# Patient Record
Sex: FEMALE | Race: WHITE
[De-identification: names, ages, dates, MRNs, and addresses within clinical notes are randomized per-mention and may not be internally consistent; named-entity substitution may affect disease eponyms.]

---

## 2018-01-22 ENCOUNTER — APPOINTMENT (OUTPATIENT)
Dept: ORTHOPEDIC SURGERY | Facility: CLINIC | Age: 63
End: 2018-01-22

## 2018-07-11 ENCOUNTER — APPOINTMENT (OUTPATIENT)
Dept: ORTHOPEDIC SURGERY | Facility: CLINIC | Age: 63
End: 2018-07-11
Payer: MEDICARE

## 2018-07-11 VITALS — HEIGHT: 62 IN | BODY MASS INDEX: 21.35 KG/M2 | WEIGHT: 116 LBS

## 2018-07-11 DIAGNOSIS — Z87.891 PERSONAL HISTORY OF NICOTINE DEPENDENCE: ICD-10-CM

## 2018-07-11 DIAGNOSIS — Z82.61 FAMILY HISTORY OF ARTHRITIS: ICD-10-CM

## 2018-07-11 DIAGNOSIS — Z78.9 OTHER SPECIFIED HEALTH STATUS: ICD-10-CM

## 2018-07-11 DIAGNOSIS — S46.012A STRAIN OF MUSCLE(S) AND TENDON(S) OF THE ROTATOR CUFF OF LEFT SHOULDER, INITIAL ENCOUNTER: ICD-10-CM

## 2018-07-11 DIAGNOSIS — I10 ESSENTIAL (PRIMARY) HYPERTENSION: ICD-10-CM

## 2018-07-11 DIAGNOSIS — Z80.9 FAMILY HISTORY OF MALIGNANT NEOPLASM, UNSPECIFIED: ICD-10-CM

## 2018-07-11 DIAGNOSIS — M81.0 AGE-RELATED OSTEOPOROSIS W/OUT CURRENT PATHOLOGICAL FRACTURE: ICD-10-CM

## 2018-07-11 DIAGNOSIS — M19.90 UNSPECIFIED OSTEOARTHRITIS, UNSPECIFIED SITE: ICD-10-CM

## 2018-07-11 DIAGNOSIS — Z82.62 FAMILY HISTORY OF OSTEOPOROSIS: ICD-10-CM

## 2018-07-11 PROCEDURE — 73030 X-RAY EXAM OF SHOULDER: CPT | Mod: 50

## 2018-07-11 PROCEDURE — 99204 OFFICE O/P NEW MOD 45 MIN: CPT | Mod: 25

## 2018-07-11 PROCEDURE — 20610 DRAIN/INJ JOINT/BURSA W/O US: CPT | Mod: LT

## 2018-07-11 RX ORDER — TELMISARTAN 80 MG/1
80 TABLET ORAL
Refills: 0 | Status: ACTIVE | COMMUNITY

## 2018-07-11 RX ORDER — OXYCODONE 10 MG/1
10 TABLET ORAL
Refills: 0 | Status: ACTIVE | COMMUNITY

## 2018-07-11 RX ORDER — METHYLPHENIDATE HYDROCHLORIDE 10 MG/1
10 TABLET ORAL
Refills: 0 | Status: ACTIVE | COMMUNITY

## 2018-07-11 RX ORDER — AMLODIPINE BESYLATE 10 MG/1
10 TABLET ORAL
Refills: 0 | Status: ACTIVE | COMMUNITY

## 2018-07-11 NOTE — PHYSICAL EXAM
[de-identified] : Bilateral  shoulders:\par Cervical spine is with mild stiffness and tenderness .\par Normal appearance shoulders. \par AROM: 180 FE with pain and difficulty bilateral shoulders, IR to T 11 with pain , 180 abd.\par PROM: 180 FE, 60 ER at the side, 90 ER and 15 IR in the 90 degree abducted position.\par Motor:  5-/5  supraspinatus with severe pain,  5-/5 ER with pain, 5/5 IR, 5/5 biceps, 5/5 deltoid.  Normal lift off test\par + Neer, + Her. -  X-arm.   + [place], + Speeds.\par Tender over the biceps tendon and the anterior shoulder.  \par Laxity: normal.\par No scapular winging.\par Sensation is intact distally in the UE.\par Skin is intact in the UE. \par Intact Motor distally.\par  [de-identified] : Incisions bilateral lower extremities. Severe varus deformity of bilateral knees [de-identified] : No respiratory distress [de-identified] : X-rays of bilateral shoulders AP and Y. lateral views today show some elevation of the right humeral head partially consistent with a mild rotator cuff arthropathy but no joint space narrowing.\par On the left side the head is well centered and no arthritis

## 2018-07-11 NOTE — DISCUSSION/SUMMARY
[de-identified] : 63-year-old woman who has a history of complete right supraspinatus rotator cuff tear at least 4 or 5 years ago now with some mild rotator cuff arthropathy but compensating relatively well. She is having more severe pain on the left side where she had a partial rotator cuff tear that likely is progressing and may be a full-thickness tear now.\par I talked to her about operative and nonoperative treatment options for rotator cuff tears in detail. The reason we did not operate several years ago when she presented with the tear is because she also has severe arthritis in bilateral lower extremities and had multiple stress fractures. She needed to use a cane and sometimes a crutch or walker to ambulate and she lies on her arms for pushing and pulling and helping her get in and out of chairs or up stairs and therefore would be more difficult to rehabilitation and recovery from shoulder surgery since she would not be able to use the affected arm. That issue remains today. That being said if her shoulders are not doing well we may want to consider surgery at some point. She would not want to do surgery now this summer and would like to have relief of pain particularly in the left shoulder. She is already on narcotics and followed by pain management because of her other joint issues.\par I recommended trying a corticosteroid injection in the left shoulder subacromial space and we will see if the conservative relief. She will go to physical therapy as well.\par Heat and ice as needed.\par Followup in 2-3 months depending on how she is doing.\par She is disabled from her multiple orthopedic issues.

## 2018-07-11 NOTE — HISTORY OF PRESENT ILLNESS
[de-identified] : Angela is now 62 yo. I last saw her in August 2014, about 4 years ago.\par She was having pain in her shoulder is starting around 2013. She had an MRI and ultrasound of the right shoulder. One MRI showed a partial tear but the other MRI and ultrasound showed a complete supraspinatus tear. She did have one corticosteroid injection. She did a lot of physical therapy and her shoulder did get significantly but never completely better. She did have a little bit of left shoulder pain at that time and an MRI showed a partial rotator cuff tear.\par  The left got progressively worse over the past several months. She normally sleeps on the left but it hurts and wakes hurt.\par If she moves either arm suddenly or Moves she may get sharp pain. Today she pulled a door which was heavy and the right was severely painful.\par Pain is variable. Pain can be 6/10 Shoulders. Pain radiates to her fingers.\par No neck pain but she feels some tightness and stiffness in her neck.\par She has pain every day Shoulders as well as lower extremities. She uses a cane to ambulate. She has severe bilateral knee and ankle osteoarthritis and deformity in bilateral legs which I treated for many years as well. She has a history of multiple stress fractures. Her arthritis is severe bone-on-bone. She did not have knee replacements done because of the severe deformity. She had consultations and a told her that she would need the bone was broken and realigned prior to doing any knee replacement and it would be at least a 2 year process involving multiple surgeries. She puts more pressure on her arms because of the leg pain and weakness and limited motion.\par \par She is on OxyContin and oxycodone for her chronic pain. She sees pain management

## 2019-11-27 ENCOUNTER — APPOINTMENT (OUTPATIENT)
Dept: ORTHOPEDIC SURGERY | Facility: CLINIC | Age: 64
End: 2019-11-27
Payer: MEDICARE

## 2019-11-27 DIAGNOSIS — M75.42 IMPINGEMENT SYNDROME OF LEFT SHOULDER: ICD-10-CM

## 2019-11-27 PROCEDURE — 73030 X-RAY EXAM OF SHOULDER: CPT | Mod: RT

## 2019-11-27 PROCEDURE — 20610 DRAIN/INJ JOINT/BURSA W/O US: CPT | Mod: RT

## 2019-11-27 PROCEDURE — 99213 OFFICE O/P EST LOW 20 MIN: CPT | Mod: 25

## 2019-11-27 RX ORDER — AMLODIPINE BESYLATE 5 MG/1
5 TABLET ORAL
Qty: 180 | Refills: 0 | Status: ACTIVE | COMMUNITY
Start: 2019-01-30

## 2019-11-27 NOTE — REASON FOR VISIT
[Initial Visit] : an initial visit for [Follow-Up Visit] : a follow-up visit for [Shoulder Pain] : shoulder pain

## 2019-11-27 NOTE — PHYSICAL EXAM
[de-identified] : Bilateral shoulders\par Cervical spine is without tenderness and ROM is within normal limits and without pain.\par no edema, ecchymoses, erythema.\par Tender RIGHT greater than LEFT glenohumeral joint.\par Forward elevation of the RIGHT shoulder is very painful but she can lift with some difficulty to about 170° similar bilaterally. No pain on the LEFT.\par Internal rotation to L4 on the RIGHT versus T10 on the LEFT.\par External rotation to about 45°.\par Right-sided with a very positive Neer and Her. Minimal discomfort on the LEFT.\par Normal neurovascular exam distally aside from some mild decreased sensation in the fingertips on the LEFT.\par \par Very antalgic gait.  severe varus of the knees. [de-identified] : \par X-rays of the RIGHT shoulder 3 views today show mild elevation of the glenohumeral joint. There may be some erosion of the under surface of the acromion consistent with rotator cuff arthropathy.

## 2019-11-27 NOTE — ASSESSMENT
[FreeTextEntry1] : 64-year-old with chronic RIGHT rotator cuff tear Diagnosed about 5 years ago.At that time and even now she is having a lot of difficulty with her legs and needs to walk with a cane and often 2 canes and needs to push with her arms frequently putting a lot of stress on her shoulders. Her RIGHT shoulder had been doing relatively well and last time I saw her she actually had more LEFT shoulder pain.\par Now the RIGHT shoulder is acutely painful in the last few weeks. We did a corticosteroid injection today which hopefully will give her some relief. Heat and ice as needed.\par Physical therapy. Home exercises doing assisted range of motion. If it's not getting better she should get an MRI and I will call her with the results or otherwise come in for followup as needed.

## 2019-11-27 NOTE — HISTORY OF PRESENT ILLNESS
[de-identified] : Ms. Henriquez is now 64 years old. She was last seen about 16 months ago. She had near complete supraspinatus tear chronically in the RIGHT shoulder first diagnosed around 2014. She was also having LEFT shoulder pain and we did a corticosteroid injection in the LEFT subacromial bursa at that time.\par She comes in now for Pain in her RIGHT shoulder. The LEFT shoulder is doing fine. It hurts to drive, move the shoulder, dress. Pain is quite severe over the last 2 weeks and not responding to medication\par she is on chronic narcotics, oxycodone and OxyContin for severe arthritis in her hips and knees posttraumatic with severe deformity in her lower extremities. She walks with crutches.

## 2019-11-27 NOTE — PROCEDURE
[Injection] : Injection [Right] : of the right [Subacromial Bursa] : subacromial bursa [Inflammation] : inflammation [Joint Pain] : joint pain [Bleeding] : bleeding [Infection] : infection [Allergic Reaction] : allergic reaction [Patient] : patient [Risk] : risk [Benefits] : benefits [Alternatives] : alternatives [Verbal Consent Obtained] : verbal consent was obtained prior to the procedure [Alcohol] : alcohol [Betadine] : betadine [Ethyl Chloride Spray] : ethyl chloride spray was used as a topical anesthetic [Posterior] : posterior [Same Needle As Aspiration/New Syringe] : the syringe was changed and the same needle was left in place and [1% Lidocaine___(mL)] : [unfilled] mL of 1% Lidocaine [Methylpred. 40mg/mL___(mL)] : [unfilled] mL 40mg/mL methylprednisolone [Bandage Applied] : a bandage [Tolerated Well] : The patient tolerated the procedure well [None] : none [No Strenuous Activity___day(s)] : avoid strenuous activity for [unfilled] day(s) [PRN] : PRN

## 2022-04-26 ENCOUNTER — APPOINTMENT (OUTPATIENT)
Dept: ORTHOPEDIC SURGERY | Facility: AMBULATORY SURGERY CENTER | Age: 67
End: 2022-04-26
Payer: MEDICARE

## 2022-04-26 DIAGNOSIS — M17.2 BILATERAL POST-TRAUMATIC OSTEOARTHRITIS OF KNEE: ICD-10-CM

## 2022-04-26 DIAGNOSIS — M19.172 POST-TRAUMATIC OSTEOARTHRITIS, LEFT ANKLE AND FOOT: ICD-10-CM

## 2022-04-26 DIAGNOSIS — M19.072 PRIMARY OSTEOARTHRITIS, LEFT ANKLE AND FOOT: ICD-10-CM

## 2022-04-26 DIAGNOSIS — M16.4: ICD-10-CM

## 2022-04-26 PROCEDURE — 73560 X-RAY EXAM OF KNEE 1 OR 2: CPT | Mod: 50

## 2022-04-26 PROCEDURE — 99214 OFFICE O/P EST MOD 30 MIN: CPT | Mod: 25

## 2022-04-26 PROCEDURE — 73630 X-RAY EXAM OF FOOT: CPT | Mod: LT

## 2022-04-26 PROCEDURE — 20605 DRAIN/INJ JOINT/BURSA W/O US: CPT | Mod: LT
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
## 2022-04-26 NOTE — PHYSICAL EXAM
[Cane] : ambulates with cane [Normal RLE] : Right Lower Extremity: No scars, rashes, lesions, ulcers, skin intact [Normal LLE] : Left Lower Extremity: No scars, rashes, lesions, ulcers, skin intact [Normal Touch] : sensation intact for touch [Normal] : Oriented to person, place, and time, insight and judgement were intact and the affect was normal [de-identified] : Bilateral knees\par Multiple well-healed incisions.\par Varus deformity left greater than right knee and flexion contractures left greater than right knee with severe crepitus on range of motion and pain.\par Left knee range of motion is from about 25 to 90 degrees flexion versus right knee is about 15 degrees to 95 degrees.\par Tender medial and lateral and especially patellofemoral joint.  Severe crepitus with range of motion.\par \par Right shoulder\par Mild cervical spine tenderness\par no edema, ecchymoses, erythema.\par Tender RIGHT greater than LEFT glenohumeral joint.\par Forward elevation of the RIGHT shoulder is painful with assisted motion to 170 degrees but actively she cannot lift the arm without assistance.  .\par Internal rotation to L4 on the RIGHT versus T10 on the LEFT.\par External rotation to about 35°.\par Profound weakness of forward elevation/supraspinatus and external rotation and 5/5 internal rotation right shoulder\par Right positive Neer and Her. Minimal discomfort on the LEFT.\par Normal neurovascular exam distally aside from some mild decreased sensation in the fingertips on the LEFT.\par \par Left ankle\par Severely tender all around the left ankle.  Bony protrusion from large osteophyte anterior ankle.\par Virtually no dorsiflexion plantarflexion of the ankle joint which is very painful.\par Sensation is intact.\par Skin is intact.  No erythema.  Mild edema.  No ecchymoses\par Varicose veins. [de-identified] : \par She brought in CDs with multiple x-rays.\par Today I obtained an AP x-ray of bilateral knees showing severe bone-on-bone osteoarthritis medial greater than lateral compartments of both knees.\par Prior x-rays show also severe degenerative changes at the patellofemoral joint.  Posttraumatic deformity with healed fractures tibia and femur\par \par Left foot x-rays AP, lateral and oblique show large anterior osteophyte on the talus and osteophytes around the ankle joint and talonavicular and calcaneocuboid degeneration.\par She had prior x-rays taken in November 2021 showing severe bone-on-bone ankle arthrosis.\par \par MRI of the right shoulder taken December 2020 showed advanced rotator cuff arthropathy with chronic full-thickness tears supraspinatus and upper fibers of the infraspinatus and interstitial tear subscapularis with fatty atrophy.  Degenerative changes of the labrum and osteoarthritis of the glenohumeral joint and AC joint.  Likely severe degeneration and tearing long head of the biceps.\par \par She also had x-rays she had in the last year or 2 of the pelvis/right hip showing moderately severe osteoarthritis of the right hip

## 2022-04-26 NOTE — REASON FOR VISIT
[Follow-Up Visit] : a follow-up visit for [Knee Pain] : knee pain [Knee Osteoarthritis] : knee osteoarthritis [Shoulder Pain] : shoulder pain [FreeTextEntry2] : Ankle pain

## 2022-04-26 NOTE — PROCEDURE
[Injection] : Injection [Left] : on the left.   [Intermediate Joint___] : [unfilled] joint [Osteoarthritis] : Osteoarthritis [Patient] : patient [Risk] : Risk [Benefits] : benefits [Alternatives] : alternatives [Bleeding] : bleeding [Infection] : infection [Allergic Reaction] : allergic reaction [Verbal Consent Obtained] : verbal consent was obtained prior to the procedure [Alcohol] : Alcohol [Ethyl Chloride Spray] : ethyl chloride spray was used as a topical anesthetic [Anterior] : anterior [22] : a 22-gauge [1% Lidocaine___(mL)] : [unfilled] mL of 1% Lidocaine [Triamcinolone 40mg/mL___(mL)] : [unfilled] ~UmL of 40mg/mL triamcinolone [Bandage Applied] : a bandage [Tolerated Well] : The patient tolerated the procedure well [None] : None [No Strenuous Activity___day(s)] : avoid strenuous activity for [unfilled] day(s) [PRN] : as needed [de-identified] : ChloraPrep

## 2022-04-26 NOTE — ASSESSMENT
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  She has developed progressively worsening osteoarthritis of her left ankle which is very painful.  Today we did a steroid injection in the left ankle and gave her an ankle support to see if this helps.  She could also try an [place] brace which is a custom molded brace.\par With all of her arthritis I recommended getting a walker rather than a cane which will allow her to get more pressure off her feet and assist in her walking.\par Pool therapy would be helpful.  She should try to do some nonweightbearing strengthening exercises.\par She also has chronic rotator cuff tearing with secondary cuff arthropathy/arthritis in the shoulder.  I gave her some exercises to work on and physical therapy prescription for this.\par We may get a new x-ray of her shoulder next visit if its not getting better.  A possible surgical option in the future would be balloon arthroplasty versus reverse shoulder replacement.\par \par She had asked her physiatrist about getting a electric stimulator implant to help with pain.  I do not have much experience with this but have seen patients using it for chronic back pain.  She certainly has severe chronic pain in bilateral lower extremities had virtually every joint but particularly both knees and the left ankle from severe posttraumatic osteoarthritis.  It would be preferable to use less narcotics to try to control her pain since she is not a good surgical candidate for joint arthroplasty with the lower extremity deformity.  She can always see the joint replacement specialist again to get another opinion on that.\par We discussed trying steroid injection or hyaluronic acid injections in her knees.  We may try that next visit\par Follow-up in about 4 to 6 weeks.

## 2022-04-26 NOTE — REVIEW OF SYSTEMS
[Joint Pain] : joint pain [Joint Stiffness] : joint stiffness [Joint Swelling] : joint swelling [Feeling Tired] : fatigue [Urinary Frequency] : urinary frequency [Urinary Urgency] : urinary urgency [Incontinence] : incontinence [Depression] : depression [Muscle Weakness] : muscle weakness [Negative] : Heme/Lymph

## 2022-04-27 ENCOUNTER — TRANSCRIPTION ENCOUNTER (OUTPATIENT)
Age: 67
End: 2022-04-27

## 2022-05-04 ENCOUNTER — NON-APPOINTMENT (OUTPATIENT)
Age: 67
End: 2022-05-04

## 2022-05-25 ENCOUNTER — APPOINTMENT (OUTPATIENT)
Dept: ORTHOPEDIC SURGERY | Facility: CLINIC | Age: 67
End: 2022-05-25

## 2022-09-02 ENCOUNTER — APPOINTMENT (OUTPATIENT)
Dept: ULTRASOUND IMAGING | Facility: CLINIC | Age: 67
End: 2022-09-02

## 2022-09-02 ENCOUNTER — APPOINTMENT (OUTPATIENT)
Dept: MAMMOGRAPHY | Facility: CLINIC | Age: 67
End: 2022-09-02

## 2022-09-02 PROCEDURE — 76641 ULTRASOUND BREAST COMPLETE: CPT | Mod: 50

## 2022-09-02 PROCEDURE — 77067 SCR MAMMO BI INCL CAD: CPT

## 2022-09-02 PROCEDURE — 77063 BREAST TOMOSYNTHESIS BI: CPT

## 2023-05-15 PROBLEM — M25.511 BILATERAL SHOULDER PAIN: Status: ACTIVE | Noted: 2018-07-11

## 2023-05-17 ENCOUNTER — APPOINTMENT (OUTPATIENT)
Dept: ORTHOPEDIC SURGERY | Facility: CLINIC | Age: 68
End: 2023-05-17
Payer: MEDICARE

## 2023-05-17 DIAGNOSIS — M25.512 PAIN IN RIGHT SHOULDER: ICD-10-CM

## 2023-05-17 DIAGNOSIS — M25.511 PAIN IN RIGHT SHOULDER: ICD-10-CM

## 2023-05-17 DIAGNOSIS — M75.121 COMPLETE ROTATOR CUFF TEAR OR RUPTURE OF RIGHT SHOULDER, NOT SPECIFIED AS TRAUMATIC: ICD-10-CM

## 2023-05-17 DIAGNOSIS — M12.811 OTHER SPECIFIC ARTHROPATHIES, NOT ELSEWHERE CLASSIFIED, RIGHT SHOULDER: ICD-10-CM

## 2023-05-17 DIAGNOSIS — S46.111A STRAIN OF MUSCLE, FASCIA AND TENDON OF LONG HEAD OF BICEPS, RIGHT ARM, INITIAL ENCOUNTER: ICD-10-CM

## 2023-05-17 PROCEDURE — 73030 X-RAY EXAM OF SHOULDER: CPT | Mod: RT

## 2023-05-17 PROCEDURE — 99214 OFFICE O/P EST MOD 30 MIN: CPT | Mod: 25

## 2023-05-17 PROCEDURE — 20610 DRAIN/INJ JOINT/BURSA W/O US: CPT | Mod: 59,RT

## 2023-05-17 NOTE — PHYSICAL EXAM
[Rad] : radial 2+ and symmetric bilaterally [Normal RUE] : Right Upper Extremity: No scars, rashes, lesions, ulcers, skin intact [Normal LUE] : Left Upper Extremity: No scars, rashes, lesions, ulcers, skin intact [de-identified] : \par Right shoulder\par Mild cervical spine tenderness\par no edema, ecchymoses, erythema.\par Tender RIGHT greater than LEFT glenohumeral joint.\par Forward elevation of the RIGHT shoulder is painful with assisted motion to 180 degrees.  She cannot lift the arm without some light assistance.  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  No ecchymoses or erythema or warmth.\par Internal rotation to right buttocks versus T10 on the LEFT.\par External rotation to about 35°.\par Profound weakness of forward elevation/supraspinatus and external rotation and 5-/5 internal rotation right shoulder\par Right positive Neer and Her. Minimal discomfort on the LEFT.\par Normal neurovascular exam distally aside from some mild decreased sensation in the fingertips on the LEFT.\par  [de-identified] : \par \par MRI of the right shoulder taken December 2020 showed advanced rotator cuff arthropathy with chronic full-thickness tears supraspinatus and upper fibers of the infraspinatus and interstitial tear subscapularis with fatty atrophy.  Degenerative changes of the labrum and osteoarthritis of the glenohumeral joint and AC joint.  Likely severe degeneration and tearing long head of the biceps.\par \par X-rays of the right shoulder 3 views today show small osteophyte humeral head with mild narrowing glenohumeral joint and partial elevation humeral head but not to the acromion.  There may be some sclerosis on the undersurface of the acromion

## 2023-05-17 NOTE — ASSESSMENT
[FreeTextEntry1] : 68-year-old with chronic retracted right rotator cuff tear with rotator cuff arthropathy moderate with recent increase in pain after she had been using the arm to clean.\par She is having a hysterectomy next week.  She needs her shoulder to be better to help after surgery.  It was decided to do a steroid injection today which hopefully will give her some relief and she did have some immediate partial relief but still weakness which is very chronic.  Ultimately reverse shoulder arthroplasty may be considered.  Balloon arthroplasty may be a consideration but may not be enough given the amount of weakness she has.  Certainly would not burn any bridges and depending on how she does after the steroid injection and some physical therapy in the future we could decide if surgery is something to consider.\par If her shoulder does not get better then she will get an MRI before I see her back to further assess the extent of rotator cuff tearing and atrophy which might help guide surgical treatment if it is needed.\par

## 2023-05-17 NOTE — PROCEDURE
[Injection] : Injection [Right] : of the right [GH Joint] : glenohumeral joint [Inflammation] : inflammation [Joint Pain] : joint pain [Bleeding] : bleeding [Infection] : infection [Allergic Reaction] : allergic reaction [Patient] : patient [Risk] : risk [Benefits] : benefits [Alternatives] : alternatives [Verbal Consent Obtained] : verbal consent was obtained prior to the procedure [Alcohol] : alcohol [Ethyl Chloride Spray] : ethyl chloride spray was used as a topical anesthetic [Posterior] : posterior [22] : a 22-gauge [1% Lidocaine___(mL)] : [unfilled] mL of 1% Lidocaine [Triamcinolone 40mg/mL___(mL)] : [unfilled] ~UmL of 40mg/mL triamcinolone [Bandage Applied] : a bandage [Tolerated Well] : The patient tolerated the procedure well [None] : none [No Strenuous Activity___day(s)] : avoid strenuous activity for [unfilled] day(s) [FreeTextEntry1] : ChloraPrep

## 2023-05-17 NOTE — HISTORY OF PRESENT ILLNESS
[de-identified] : Ms. Henriquez is now 68 years old. She was last seen about  1 yr ago.  He comes in for bilateral shoulder pain much worse on the right than left that got worse at the end of April.  She had been sick and then had to do some cleaning and after polishing some furniture she had severe right shoulder pain and swelling.  It was harder to lift her arm and it was red and swollen.  The redness and swelling went down.  Pain has been decreasing but now is about 5 out of 10.\par She takes OxyContin 30 mg and oxycodone for pain chronically.  She takes Tylenol.  She cannot take NSAIDs because she is allergic.\par She also has chronic shoulder pain on the right side with a chronic rotator cuff tear and was developing rotator cuff arthropathy.\par She is going to be having a hysterectomy, oophorectomy and bladder surgery in the near future and needs her shoulder to feel better.\par For the last few years she has to assist her shoulder elevation on the right side helping it with her left arm.  Once she gets it up overhead then she can hold the arm up.  No stiffness has been present.\par

## 2024-03-19 ENCOUNTER — APPOINTMENT (OUTPATIENT)
Dept: MAMMOGRAPHY | Facility: CLINIC | Age: 69
End: 2024-03-19
Payer: MEDICARE

## 2024-03-19 ENCOUNTER — APPOINTMENT (OUTPATIENT)
Dept: ULTRASOUND IMAGING | Facility: CLINIC | Age: 69
End: 2024-03-19
Payer: MEDICARE

## 2024-03-19 PROCEDURE — 77067 SCR MAMMO BI INCL CAD: CPT

## 2024-03-19 PROCEDURE — 77063 BREAST TOMOSYNTHESIS BI: CPT

## 2024-03-19 PROCEDURE — 76641 ULTRASOUND BREAST COMPLETE: CPT | Mod: 50,GY

## 2024-07-26 ENCOUNTER — APPOINTMENT (OUTPATIENT)
Dept: PAIN MANAGEMENT | Facility: CLINIC | Age: 69
End: 2024-07-26
Payer: MEDICARE

## 2024-07-26 VITALS
SYSTOLIC BLOOD PRESSURE: 126 MMHG | HEIGHT: 62 IN | BODY MASS INDEX: 18.58 KG/M2 | DIASTOLIC BLOOD PRESSURE: 74 MMHG | OXYGEN SATURATION: 97 % | HEART RATE: 88 BPM | WEIGHT: 101 LBS

## 2024-07-26 DIAGNOSIS — M47.816 SPONDYLOSIS W/OUT MYELOPATHY OR RADICULOPATHY, LUMBAR REGION: ICD-10-CM

## 2024-07-26 DIAGNOSIS — M25.511 PAIN IN RIGHT SHOULDER: ICD-10-CM

## 2024-07-26 DIAGNOSIS — M19.90 UNSPECIFIED OSTEOARTHRITIS, UNSPECIFIED SITE: ICD-10-CM

## 2024-07-26 DIAGNOSIS — M19.072 PRIMARY OSTEOARTHRITIS, LEFT ANKLE AND FOOT: ICD-10-CM

## 2024-07-26 DIAGNOSIS — M25.512 PAIN IN RIGHT SHOULDER: ICD-10-CM

## 2024-07-26 DIAGNOSIS — M17.2 BILATERAL POST-TRAUMATIC OSTEOARTHRITIS OF KNEE: ICD-10-CM

## 2024-07-26 DIAGNOSIS — M19.072 PRIMARY OSTEOARTHRITIS, RIGHT ANKLE AND FOOT: ICD-10-CM

## 2024-07-26 DIAGNOSIS — M19.071 PRIMARY OSTEOARTHRITIS, RIGHT ANKLE AND FOOT: ICD-10-CM

## 2024-07-26 PROCEDURE — 99205 OFFICE O/P NEW HI 60 MIN: CPT

## 2024-07-29 PROBLEM — M19.071 PRIMARY OSTEOARTHRITIS OF BOTH ANKLES: Status: ACTIVE | Noted: 2024-07-29

## 2024-07-31 NOTE — PHYSICAL EXAM
[Normal] : Well developed, in no acute distress, alert and oriented to person, place and time [Spinous Process Tenderness] : spinous process tenderness [Facet Tenderness] : facet tenderness [Paraspinal Tenderness] : paraspinal tenderness [Antalgic] : antalgic [Walker] : ambulates with walker [Patellar] : patellar 2+ and symmetric bilaterally [Achilles] : Achilles 2+ and symmetric bilaterally [de-identified] : limited exam due to Patient with significant hypersensitivity to touch.  [de-identified] : Crepitus with any movement on bilateral knees, b/L ankles minimal ROM.   Anterior knee and lateral ankle significant tenderness to palpation.  [de-identified] : 3/5 Rigth sided shoulder abduction, 4/5 LUE, BLE  [de-identified] : significant edema in bilateral ankles.

## 2024-07-31 NOTE — ASSESSMENT
[FreeTextEntry1] : The patient is a 69 year old female presenting to clinic today to discuss further intervention options for her bilateral knee and ankle pain. She has a long history of multiple orthopedic fractures in her b/l lower extremities which have resulted in significant osteoarthritis in her knee and ankle joints. She also has some back pain as well which she states is not as severe as her legs. We discussed a multitude of options regarding treatment, but first recommended getting new Lumbar MRI imaging in which she agreed with.  We broached the subject of spinal cord stimulation trial that could hopefully provide her coverage in her painful areas and reduce the amount of oral medication she is taking. I discussed in detail risks of SCS implant including spinal cord injury, paralysis, nerve injury, CSF leak, headache, post dural puncture headache, pneumocephalus, worse pain, infection or bleeding including epidural hematoma or abscess requiring surgery, lead fracture or migration requiring surgical revision, generator site pain, incomplete coverage and/or pain relief, need for explanation in order to obtain MRI, etc. Patient understands those risks..  I answered all questions and asked the patient to write down any other questions that arise so that I can answer them at the next visit or prior to trial. She agreed with all recommendations.   Plan Order for Lumbar MRI provided patient with neuropsych information for evaluation for potential SCS trial  patient will f/u in clinic to discuss SCS trial in dept and imaging findings.

## 2024-07-31 NOTE — END OF VISIT
[Time Spent: ___ minutes] : I have spent [unfilled] minutes of time on the encounter. Unknown Pediatrician name

## 2024-07-31 NOTE — PHYSICAL EXAM
[Normal] : Well developed, in no acute distress, alert and oriented to person, place and time [Spinous Process Tenderness] : spinous process tenderness [Facet Tenderness] : facet tenderness [Paraspinal Tenderness] : paraspinal tenderness [Antalgic] : antalgic [Walker] : ambulates with walker [Patellar] : patellar 2+ and symmetric bilaterally [Achilles] : Achilles 2+ and symmetric bilaterally [de-identified] : limited exam due to Patient with significant hypersensitivity to touch.  [de-identified] : Crepitus with any movement on bilateral knees, b/L ankles minimal ROM.   Anterior knee and lateral ankle significant tenderness to palpation.  [de-identified] : 3/5 Rigth sided shoulder abduction, 4/5 LUE, BLE  [de-identified] : significant edema in bilateral ankles.

## 2024-07-31 NOTE — PHYSICAL EXAM
[Normal] : Well developed, in no acute distress, alert and oriented to person, place and time [Spinous Process Tenderness] : spinous process tenderness [Facet Tenderness] : facet tenderness [Paraspinal Tenderness] : paraspinal tenderness [Antalgic] : antalgic [Walker] : ambulates with walker [Patellar] : patellar 2+ and symmetric bilaterally [Achilles] : Achilles 2+ and symmetric bilaterally [de-identified] : limited exam due to Patient with significant hypersensitivity to touch.  [de-identified] : Crepitus with any movement on bilateral knees, b/L ankles minimal ROM.   Anterior knee and lateral ankle significant tenderness to palpation.  [de-identified] : 3/5 Rigth sided shoulder abduction, 4/5 LUE, BLE  [de-identified] : significant edema in bilateral ankles.
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
## 2024-09-26 ENCOUNTER — APPOINTMENT (OUTPATIENT)
Dept: ORTHOPEDIC SURGERY | Facility: CLINIC | Age: 69
End: 2024-09-26
Payer: MEDICARE

## 2024-09-26 DIAGNOSIS — M75.121 COMPLETE ROTATOR CUFF TEAR OR RUPTURE OF RIGHT SHOULDER, NOT SPECIFIED AS TRAUMATIC: ICD-10-CM

## 2024-09-26 DIAGNOSIS — M12.811 OTHER SPECIFIC ARTHROPATHIES, NOT ELSEWHERE CLASSIFIED, RIGHT SHOULDER: ICD-10-CM

## 2024-09-26 DIAGNOSIS — M19.072 PRIMARY OSTEOARTHRITIS, LEFT ANKLE AND FOOT: ICD-10-CM

## 2024-09-26 DIAGNOSIS — M75.42 IMPINGEMENT SYNDROME OF LEFT SHOULDER: ICD-10-CM

## 2024-09-26 DIAGNOSIS — S46.012A STRAIN OF MUSCLE(S) AND TENDON(S) OF THE ROTATOR CUFF OF LEFT SHOULDER, INITIAL ENCOUNTER: ICD-10-CM

## 2024-09-26 PROCEDURE — 20610 DRAIN/INJ JOINT/BURSA W/O US: CPT | Mod: 59,LT

## 2024-09-26 PROCEDURE — 99214 OFFICE O/P EST MOD 30 MIN: CPT | Mod: 25

## 2024-09-26 PROCEDURE — 73030 X-RAY EXAM OF SHOULDER: CPT | Mod: 50

## 2024-09-26 NOTE — ASSESSMENT
[FreeTextEntry1] : 69-year-old with chronic retracted right rotator cuff tear with rotator cuff arthropathy moderate with progressive loss of motion and strength on the right.  The pain on the right has decreased considerably but now the left shoulder is hurting which is traditionally her better shoulder.  There is much better motion and strength but mild degenerative changes.  There is some satisfactory rotator cuff strength at this time.  We talked about trying a steroid injection today and physical therapy to see if it gives her relief.  If not we will get a new MRI of her left shoulder.  I could not find a recent left shoulder MRI but we had done several in the past on the right. She can take Tylenol as needed for pain and she is on chronic narcotics for pain management she cannot take NSAIDs because she is allergic.  Heat and ice as needed. We may consider an injection for the ankle arthritis which we have done in the past if that is continuing to hurt as well. Follow-up in 6 to 8 weeks or as needed

## 2024-09-26 NOTE — PHYSICAL EXAM
[Cane] : ambulates with cane [Rad] : radial 2+ and symmetric bilaterally [Normal RUE] : Right Upper Extremity: No scars, rashes, lesions, ulcers, skin intact [Normal LUE] : Left Upper Extremity: No scars, rashes, lesions, ulcers, skin intact [Normal RLE] : Right Lower Extremity: No scars, rashes, lesions, ulcers, skin intact [Normal LLE] : Left Lower Extremity: No scars, rashes, lesions, ulcers, skin intact [Normal Touch] : sensation intact for touch [Normal] : Oriented to person, place, and time, insight and judgement were intact and the affect was normal [de-identified] : Bilateral shoulders Mild cervical spine tenderness no edema, ecchymoses, erythema. Tender RIGHT greater than LEFT glenohumeral joint. Forward elevation of the RIGHT shoulder is to about 90 degrees with shoulder shrug and mild pain.  Positive drop arm. 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 No effusion no ecchymoses or erythema or warmth. Internal rotation to right buttocks versus T10 on the LEFT. External rotation to about 35. Motor is with 1/5 right supraspinatus versus 5 -/5 left and 5 -/5 external rotation left versus 3/5 right and 4/5 right knee internal rotation and 5/5 left internal rotation positive Neer and Her.  Normal neurovascular exam distally aside from some mild decreased sensation in the fingertips on the LEFT.  [de-identified] :  MRI of the right shoulder taken December 2020 showed advanced rotator cuff arthropathy with chronic full-thickness tears supraspinatus and upper fibers of the infraspinatus and interstitial tear subscapularis with fatty atrophy.  Degenerative changes of the labrum and osteoarthritis of the glenohumeral joint and AC joint.  Likely severe degeneration and tearing long head of the biceps.  X-rays of both shoulders 3 views today to follow-up on pain and arthritis show evidence of rotator cuff arthropathy on the right with osteophyte and mild joint space narrowing and significant elevation of the humeral head and bone wear on the acromion. On the left there is more mild degenerative changes in the head is centered on the glenoid.  I see x-rays from 12 years ago of the right ankle with severe bone-on-bone osteoarthritis

## 2024-09-26 NOTE — HISTORY OF PRESENT ILLNESS
[de-identified] : Ms. Henriquez is now 69 years old. She was last seen about  1.5 yr ago.  She comes in for bilateral shoulder pain which in the past was much worse on the right than left with a chronic rotator cuff tear right shoulder and was developing rotator cuff arthropathy. Steroid injection was done right GH joint last visit in May. That injection has been very helpful.  Today she comes in for LEFT shoulder pain which has been worse in the past month. It is very painful at night and is having trouble sleeping.  She favors the right and uses her left a lot and had been doing more activity lifting papers.  She walks with a cane or a walker. She takes OxyContin 30 mg and oxycodone for pain chronically.  She takes Tylenol.  She cannot take NSAIDs because she is allergic. She has done physical therapy in the past.  She has arthritis that is severe in her knees and ankles as well posttraumatic and lumbar spondylosis.  She talked to pain management about getting a stimulator/implant for the chronic back pain which she is considering doing. She states that one of her ankle joints locks up sometimes and gets a lot of pain.  She does pool therapy which is very good for her.  This has helped her bone density and she feels very good in the water.

## 2024-09-26 NOTE — REASON FOR VISIT
[Follow-Up Visit] : a follow-up visit for [Shoulder Pain] : shoulder pain [FreeTextEntry2] : Ankle pain

## 2024-09-26 NOTE — PROCEDURE
[Injection] : Injection [Left] : of the left [Subacromial Bursa] : subacromial bursa [Inflammation] : inflammation [Joint Pain] : joint pain [Bleeding] : bleeding [Infection] : infection [Allergic Reaction] : allergic reaction [Patient] : patient [Risk] : risk [Benefits] : benefits [Alternatives] : alternatives [Verbal Consent Obtained] : verbal consent was obtained prior to the procedure [Alcohol] : alcohol [Ethyl Chloride Spray] : ethyl chloride spray was used as a topical anesthetic [Posterior] : posterior [22] : a 22-gauge [1% Lidocaine___(mL)] : [unfilled] mL of 1% Lidocaine [Triamcinolone 40mg/mL___(mL)] : [unfilled] ~UmL of 40mg/mL triamcinolone [Bandage Applied] : a bandage [Tolerated Well] : The patient tolerated the procedure well [None] : none [No Strenuous Activity___day(s)] : avoid strenuous activity for [unfilled] day(s) [PRN] : PRN [FreeTextEntry1] : ChloraPrep

## 2024-09-26 NOTE — PHYSICAL EXAM
[Cane] : ambulates with cane [Rad] : radial 2+ and symmetric bilaterally [Normal RUE] : Right Upper Extremity: No scars, rashes, lesions, ulcers, skin intact [Normal LUE] : Left Upper Extremity: No scars, rashes, lesions, ulcers, skin intact [Normal RLE] : Right Lower Extremity: No scars, rashes, lesions, ulcers, skin intact [Normal LLE] : Left Lower Extremity: No scars, rashes, lesions, ulcers, skin intact [Normal Touch] : sensation intact for touch [Normal] : Oriented to person, place, and time, insight and judgement were intact and the affect was normal [de-identified] : Bilateral shoulders Mild cervical spine tenderness no edema, ecchymoses, erythema. Tender RIGHT greater than LEFT glenohumeral joint. Forward elevation of the RIGHT shoulder is to about 90 degrees with shoulder shrug and mild pain.  Positive drop arm. 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 No effusion no ecchymoses or erythema or warmth. Internal rotation to right buttocks versus T10 on the LEFT. External rotation to about 35. Motor is with 1/5 right supraspinatus versus 5 -/5 left and 5 -/5 external rotation left versus 3/5 right and 4/5 right knee internal rotation and 5/5 left internal rotation positive Neer and Her.  Normal neurovascular exam distally aside from some mild decreased sensation in the fingertips on the LEFT.  [de-identified] :  MRI of the right shoulder taken December 2020 showed advanced rotator cuff arthropathy with chronic full-thickness tears supraspinatus and upper fibers of the infraspinatus and interstitial tear subscapularis with fatty atrophy.  Degenerative changes of the labrum and osteoarthritis of the glenohumeral joint and AC joint.  Likely severe degeneration and tearing long head of the biceps.  X-rays of both shoulders 3 views today to follow-up on pain and arthritis show evidence of rotator cuff arthropathy on the right with osteophyte and mild joint space narrowing and significant elevation of the humeral head and bone wear on the acromion. On the left there is more mild degenerative changes in the head is centered on the glenoid.  I see x-rays from 12 years ago of the right ankle with severe bone-on-bone osteoarthritis

## 2024-09-26 NOTE — HISTORY OF PRESENT ILLNESS
[de-identified] : Ms. Henriquez is now 69 years old. She was last seen about  1.5 yr ago.  She comes in for bilateral shoulder pain which in the past was much worse on the right than left with a chronic rotator cuff tear right shoulder and was developing rotator cuff arthropathy. Steroid injection was done right GH joint last visit in May. That injection has been very helpful.  Today she comes in for LEFT shoulder pain which has been worse in the past month. It is very painful at night and is having trouble sleeping.  She favors the right and uses her left a lot and had been doing more activity lifting papers.  She walks with a cane or a walker. She takes OxyContin 30 mg and oxycodone for pain chronically.  She takes Tylenol.  She cannot take NSAIDs because she is allergic. She has done physical therapy in the past.  She has arthritis that is severe in her knees and ankles as well posttraumatic and lumbar spondylosis.  She talked to pain management about getting a stimulator/implant for the chronic back pain which she is considering doing. She states that one of her ankle joints locks up sometimes and gets a lot of pain.  She does pool therapy which is very good for her.  This has helped her bone density and she feels very good in the water.

## 2024-10-28 ENCOUNTER — NON-APPOINTMENT (OUTPATIENT)
Age: 69
End: 2024-10-28

## 2024-11-08 ENCOUNTER — APPOINTMENT (OUTPATIENT)
Dept: ORTHOPEDIC SURGERY | Facility: CLINIC | Age: 69
End: 2024-11-08